# Patient Record
Sex: MALE | Race: WHITE
[De-identification: names, ages, dates, MRNs, and addresses within clinical notes are randomized per-mention and may not be internally consistent; named-entity substitution may affect disease eponyms.]

---

## 2018-06-08 ENCOUNTER — HOSPITAL ENCOUNTER (OUTPATIENT)
Dept: HOSPITAL 47 - LABWHC1 | Age: 78
Discharge: HOME | End: 2018-06-08
Attending: ANESTHESIOLOGY
Payer: MEDICARE

## 2018-06-08 DIAGNOSIS — Z01.818: Primary | ICD-10-CM

## 2018-06-08 PROCEDURE — 93005 ELECTROCARDIOGRAM TRACING: CPT

## 2018-06-08 PROCEDURE — 36415 COLL VENOUS BLD VENIPUNCTURE: CPT

## 2018-07-26 ENCOUNTER — HOSPITAL ENCOUNTER (EMERGENCY)
Dept: HOSPITAL 47 - EC | Age: 78
Discharge: HOME | End: 2018-07-26
Payer: MEDICARE

## 2018-07-26 VITALS
SYSTOLIC BLOOD PRESSURE: 171 MMHG | HEART RATE: 64 BPM | TEMPERATURE: 97.9 F | DIASTOLIC BLOOD PRESSURE: 79 MMHG | RESPIRATION RATE: 18 BRPM

## 2018-07-26 DIAGNOSIS — Z91.030: ICD-10-CM

## 2018-07-26 DIAGNOSIS — Z87.891: ICD-10-CM
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
## 2018-07-26 DIAGNOSIS — S52.572A: Primary | ICD-10-CM

## 2018-07-26 DIAGNOSIS — Z98.890: ICD-10-CM

## 2018-07-26 PROCEDURE — 29125 APPL SHORT ARM SPLINT STATIC: CPT

## 2018-07-26 PROCEDURE — 99283 EMERGENCY DEPT VISIT LOW MDM: CPT

## 2018-07-26 NOTE — ED
General Adult HPI





- General


Chief complaint: Fall


Stated complaint: Wrist injury


Source: patient


Mode of arrival: ambulatory


Limitations: no limitations





- History of Present Illness


Initial comments: 





Dictation was produced using dragon dictation software. please excuse any 

grammatical, word or spelling errors. 





Chief Complaint: 77-year-old  male past medical history of dyslipidemia

, hypertension presents with left wrist pain.





History of Present Illness: Patient states that approximately 6 PM yesterday he 

was trimming the hedges while standing a ladder.  Patient is reaching when he 

fell off the ladder onto some soft dirt.  States that he fell to the ground.  

When he hit the ground.  His left wrist was crushed between tension tremor and 

the ground.  Patient iced it immediately.  He immediately experienced pain and 

some swelling.  Presents today because his wrist became much more swollen.  

Denies any paresthesias to the left hand.  Patient has no other complaints.








The ROS documented in this emergency department record has been reviewed and 

confirmed by me.  Those systems with pertinent positive or negative responses 

have been documented in the HPI.  All other systems are other negative and/or 

noncontributory.





- Related Data


 Allergies











Allergy/AdvReac Type Severity Reaction Status Date / Time


 


bee venom protein (honey bee) Allergy  Anaphylaxis Verified 07/26/18 05:45














Review of Systems


ROS Statement: 


Those systems with pertinent positive or pertinent negative responses have been 

documented in the HPI.





ROS Other: All systems not noted in ROS Statement are negative.





Past Medical History


Past Medical History: Hyperlipidemia, Hypertension


Additional Past Medical History / Comment(s): glaucoma


History of Any Multi-Drug Resistant Organisms: None Reported


Past Surgical History: Back Surgery, Orthopedic Surgery, Prostate Surgery


Additional Past Surgical History / Comment(s): right foot


Past Psychological History: No Psychological Hx Reported


Smoking Status: Former smoker


Past Alcohol Use History: Daily


Past Drug Use History: None Reported





General Exam





- General Exam Comments


Initial Comments: 














PHYSICAL EXAM:


General Impression: Alert and oriented x3, not in acute distress


HEENT: Normocephalic atraumatic, extra-ocular movements intact, pupils equal 

and reactive to light bilaterally, mucous membranes moist.


Cardiovascular: Heart regular rate and rhythm, S1&S2 audible, no murmurs, rubs 

or gallops


Chest: Lungs clear to auscultation bilaterally, no rhonchi, no wheeze, no rales


Abdomen: Bowel sounds present, abdomen soft, non-tender, non-distended, no 

organomegaly


Musculoskeletal: Pulses present and equal in all extremities, no peripheral 

edema, gross deformity to the left wrist.Snuffbox tenderness


Motor: Power 5/5 bilaterally, no focal deficits noted


Neurological: CN II-XII grossly intact, no focal motor or sensory deficits noted


Skin: Intact with no visualized rashes


Psych: Normal affect and mood


Limitations: no limitations





Course


 Vital Signs











  07/26/18





  05:40


 


Temperature 97.9 F


 


Pulse Rate 64


 


Respiratory 18





Rate 


 


Blood Pressure 171/79


 


O2 Sat by Pulse 97





Oximetry 














Medical Decision Making





- Medical Decision Making








ED course: 7-year-old  male presents with left wrist pain after fall.  

Patient has vital signs within acceptable limits.  X-ray of the wrist ordered.  

X-ray of the wrist was obtained showing comminuted distal fracture extending 

into the joint.  Discussed patient case with orthopedic surgery on-call Dr. Mac who will follow up with patient in clinic today.  Patient placed in a 

short arm volar splint.  She appears comfortable.  Patient given clinic 

information to orthopedic surgery.  Patient understandable and agreeable to 

plan.  No other suspicion of other acute traumatic injuries at this time.














Disposition


Clinical Impression: 


 Distal radius fracture, left





Disposition: HOME SELF-CARE


Condition: Good


Instructions:  Wrist Fracture in Adults (ED)


Is patient prescribed a controlled substance at d/c from ED?: No


Referrals: 


Abiodun Jaquez MD [Primary Care Provider] - 1-2 days


Gordo Mac MD [STAFF PHYSICIAN] - 1-2 days


Time of Disposition: 06:36

## 2018-07-26 NOTE — XR
EXAMINATION TYPE: XR wrist complete LT

 

DATE OF EXAM: 7/26/2018

 

CLINICAL HISTORY: Fall injury with pain.

 

TECHNIQUE:  Frontal, lateral, scaphoid, and oblique images of the left wrist are obtained.

 

COMPARISON: None

 

FINDINGS:  There is acute comminuted minimally displaced intra-articular fracture through distal radi
al meta-epiphysis. Adjacent ulnar styloid is intact. There is narrowing and subchondral cystic change
 at base of first metacarpal.  The overlying soft tissue appears unremarkable.

 

IMPRESSION:  There is acute comminuted minimally displaced intra-articular fracture of distal radial 
meta-epiphysis.

 

(Initial encounter closed type posttraumatic fracture)

## 2019-10-31 ENCOUNTER — HOSPITAL ENCOUNTER (OUTPATIENT)
Dept: HOSPITAL 47 - ORWHC2ENDO | Age: 79
Discharge: HOME | End: 2019-10-31
Attending: INTERNAL MEDICINE
Payer: MEDICARE

## 2019-10-31 VITALS — DIASTOLIC BLOOD PRESSURE: 81 MMHG | SYSTOLIC BLOOD PRESSURE: 134 MMHG | HEART RATE: 55 BPM

## 2019-10-31 VITALS — TEMPERATURE: 96.4 F

## 2019-10-31 VITALS — BODY MASS INDEX: 30.4 KG/M2

## 2019-10-31 VITALS — RESPIRATION RATE: 18 BRPM

## 2019-10-31 DIAGNOSIS — D12.4: ICD-10-CM

## 2019-10-31 DIAGNOSIS — K57.30: ICD-10-CM

## 2019-10-31 DIAGNOSIS — Z79.899: ICD-10-CM

## 2019-10-31 DIAGNOSIS — D12.0: ICD-10-CM

## 2019-10-31 DIAGNOSIS — E78.5: ICD-10-CM

## 2019-10-31 DIAGNOSIS — Z79.82: ICD-10-CM

## 2019-10-31 DIAGNOSIS — Z91.030: ICD-10-CM

## 2019-10-31 DIAGNOSIS — I10: ICD-10-CM

## 2019-10-31 DIAGNOSIS — Z12.11: Primary | ICD-10-CM

## 2019-10-31 DIAGNOSIS — K62.1: ICD-10-CM

## 2019-10-31 PROCEDURE — 45385 COLONOSCOPY W/LESION REMOVAL: CPT

## 2019-10-31 PROCEDURE — 88305 TISSUE EXAM BY PATHOLOGIST: CPT

## 2019-10-31 PROCEDURE — 45388 COLONOSCOPY W/ABLATION: CPT

## 2019-10-31 PROCEDURE — 45380 COLONOSCOPY AND BIOPSY: CPT

## 2019-10-31 NOTE — P.PCN
Date of Procedure: 10/31/19


Description of Procedure: 





BRIEF HISTORY: 


Patient is a 79-year-old pleasant male scheduled for an elective colonoscopy as 

a part of screening for malignant neoplasm of the colon.  Last colonoscopy 

approximately 10 years ago normal per the patient's recollection.  He denies any

change in bowel habits, blood per rectum or abdominal pain.  No family history 

of colon cancer.





PROCEDURE PERFORMED: 


Colonoscopy with polypectomy and argon plasma coagulation therapy. 





PREOPERATIVE DIAGNOSIS: 


Screening for malignant neoplasm in the colon, last colonoscopy approximately 10

years ago. 





ESTIMATED BLOOD LOSS: 


Minimal.





IV sedation per Anesthesia. 





PROCEDURE: 


After informed consent was obtained, the patient, was brought into the endoscopy

unit. IV sedation was administered by Anesthesia under continuous monitoring.  

Digital rectal examination was normal. Initially the Olympus CF-190 flexible 

video colonoscope was then inserted in the rectum, gradually advanced into the 

cecum without any difficulty. Careful examination was performed as the scope was

gradually being withdrawn. Ileocecal valve and the appendiceal orifice were 

visualized and appeared normal.  Prep was excellent. Mucosa of the cecum, 

ascending colon, transverse colon, descending colon, sigmoid colon, and rectum a

ppeared normal.  A flat polyp measuring 3 cm in size and appearing to be a 

tubulovillous polyp was removed from the cecum just adjacent to the appendiceal 

opening and piecemeal fashion with cold snare polypectomy and then treated with 

argon plasma coagulation therapy.  2 diminutive polyps one from the rectum and 

one from the descending colon measuring 1 and 2 mm incisors practically were 

removed with cold forcep polypectomy.  Mild sigmoid diverticulosis.  

Retroflexion was performed in the rectum and no lesions were seen. The patient 

tolerated the procedure well. 





IMPRESSION: 


Large cecal polyp appearing tubulovillous in nature removed with cold snare 

polypectomy and treated with argon plasma ablation therapy.


2 diminutive polyps removed from the rectum and descending colon with cold force

p polypectomy.


Mild sigmoid diverticulosis.





RECOMMENDATIONS:  


Findings of this examination were discussed with the patient and his family.  

Okay to resume diet, would recommend low fiber low residual for the next week.  

Okay to resume medications.  Await pathology from polypectomies.  Anticipate 

repeat colonoscopy in 3-6 months due to piecemeal resection of the large cecal 

polyp.